# Patient Record
Sex: FEMALE | Race: WHITE | NOT HISPANIC OR LATINO | Employment: FULL TIME | URBAN - METROPOLITAN AREA
[De-identification: names, ages, dates, MRNs, and addresses within clinical notes are randomized per-mention and may not be internally consistent; named-entity substitution may affect disease eponyms.]

---

## 2017-01-23 ENCOUNTER — TELEPHONE (OUTPATIENT)
Dept: NEUROLOGY | Facility: MEDICAL CENTER | Age: 57
End: 2017-01-23

## 2017-01-23 NOTE — TELEPHONE ENCOUNTER
Called Levetiracetam ( Keppra)  500mg tabs  SIG take 1/2 tab by mouth twice daily and as needed. #35   11 [facility] to [facility]

## 2017-01-24 DIAGNOSIS — G40.109 SYMPTOMATIC LOCALIZATION-RELATED EPILEPSY (HCC): ICD-10-CM

## 2017-01-25 RX ORDER — LEVETIRACETAM 500 MG/1
250 TABLET ORAL 2 TIMES DAILY
Qty: 35 TAB | Refills: 11 | Status: SHIPPED | OUTPATIENT
Start: 2017-01-25 | End: 2017-01-27 | Stop reason: SDUPTHER

## 2017-01-25 NOTE — TELEPHONE ENCOUNTER
Was the patient seen in the last year in this department? Yes  3/8/16    Does patient have an active prescription for medications requested? Yes     Received Request Via: Pharmacy    Next Scheduled Appointment: 1/27/17

## 2017-01-27 ENCOUNTER — OFFICE VISIT (OUTPATIENT)
Dept: NEUROLOGY | Facility: MEDICAL CENTER | Age: 57
End: 2017-01-27
Payer: COMMERCIAL

## 2017-01-27 VITALS
BODY MASS INDEX: 19.14 KG/M2 | SYSTOLIC BLOOD PRESSURE: 100 MMHG | TEMPERATURE: 96.8 F | DIASTOLIC BLOOD PRESSURE: 56 MMHG | WEIGHT: 104 LBS | HEIGHT: 62 IN | OXYGEN SATURATION: 100 % | HEART RATE: 45 BPM

## 2017-01-27 DIAGNOSIS — G40.109 SYMPTOMATIC LOCALIZATION-RELATED EPILEPSY (HCC): Primary | ICD-10-CM

## 2017-01-27 PROCEDURE — 99213 OFFICE O/P EST LOW 20 MIN: CPT | Performed by: PSYCHIATRY & NEUROLOGY

## 2017-01-27 RX ORDER — LEVETIRACETAM 500 MG/1
TABLET ORAL
Qty: 50 TAB | Refills: 11 | Status: SHIPPED | OUTPATIENT
Start: 2017-01-27 | End: 2017-03-20 | Stop reason: SDUPTHER

## 2017-01-27 ASSESSMENT — ENCOUNTER SYMPTOMS
SEIZURES: 0
LOSS OF CONSCIOUSNESS: 0

## 2017-01-27 NOTE — PROGRESS NOTES
"Subjective:      Rosalia Haines is a 57 y.o. female who presents for one-year follow-up with a history of symptomatic partial seizures.    HPI    Seen nearly 1 year ago, Rosalia has had only the occasional premonitory symptoms of a \"sickness\" in her stomach, typically occurring in the morning hours. This never progresses. She remains on Keppra 500 mg in the morning, 250 mg in the afternoon, and an occasional 250 mg if she feels the need. The small recurrences tend to occur following a hard days work or a poor nights sleep. She remains physically active otherwise. She tolerates the Keppra without issue.    We did repeat MRI scan of the brain with and without contrast in April of last year after she was seen initially, this revealed only the right temporal encephalomalacia near the surgical site, no evidence of recurrent tumor or developing hydrocephalus.    Medical, surgical and family histories are reviewed, there are no new drug allergies. She is on Keppra as above, no other medications.    Review of Systems   Neurological: Negative for seizures and loss of consciousness.   All other systems reviewed and are negative.       Objective:     /56 mmHg  Pulse 45  Temp(Src) 36 °C (96.8 °F)  Ht 1.575 m (5' 2.01\")  Wt 47.174 kg (104 lb)  BMI 19.02 kg/m2  SpO2 100%     Physical Exam    She appears in no acute distress. Vital signs are stable. There is no malar rash. The neck is supple, cardiac evaluation is unremarkable. Including mental status, cranial nerves, motor, coordination, and sensory evaluations, her neurologic examination is fully intact without evidence of any focal deficits.    Assessment/Plan:     1. Symptomatic localization-related epilepsy (CMS-HCC)  Stable, I'm not overly concerned about the occasional simple partial seizures that she may be having in the morning hours. She is certainly well controlled otherwise. It still suggests he will likely need medication for the rest of her life, " certainly in the short-term. Fortunately the tumor has not recurred, the likelihood it well now that she is almost 18 years after resection, is quite unlikely. I will follow-up with her in one year. Phone contact in the interim if needed.    - levetiracetam (KEPPRA) 500 MG Tab; 1 tab qAM and 0.5 tab in afternoon, additional tab as needed  Dispense: 50 Tab; Refill: 11    Time: Evaluation of 20 minutes for exam, review, discussion, and education  Discussion: As mentioned in the assessment, over 50% of time spent face-to-face counseling and coordinating care.

## 2017-01-27 NOTE — MR AVS SNAPSHOT
"        Rosalia Haines   2017 1:20 PM   Office Visit   MRN: [id number]    Department:  [facility]   Dept Phone:  [phone]    Description:  Female : 1960   Provider:  Abimael Capone M.D.           Reason for Visit     Follow-Up seizures      Allergies as of 2017     No Known Allergies      You were diagnosed with     Symptomatic localization-related epilepsy (CMS-HCC)   [708286]         Vital Signs     Blood Pressure Pulse Temperature Height Weight Body Mass Index    100/56 mmHg 45 36 °C (96.8 °F) 1.575 m (5' 2.01\") 47.174 kg (104 lb) 19.02 kg/m2    Oxygen Saturation Smoking Status                100% Never Smoker           Basic Information     Date Of Birth Sex Race Ethnicity Preferred Language    1960 Female White Non- English      Problem List              ICD-10-CM Priority Class Noted - Resolved    Hyperlipidemia E78.5   10/21/2015 - Present    Faintness R55   10/21/2015 - Present    Breast mass N63   10/21/2015 - Present    Routine general medical examination at a health care facility Z00.00   10/21/2015 - Present    Psoriasis L40.9   10/21/2015 - Present    Elevated blood pressure I10   2016 - Present      Health Maintenance        Date Due Completion Dates    IMM DTaP/Tdap/Td Vaccine (1 - Tdap) 1981 ---    PAP SMEAR 1983 ---    MAMMOGRAM 2002 ---    COLONOSCOPY 2012 ---    IMM INFLUENZA (1) 2016 ---            Current Immunizations     No immunizations on file.      Below and/or attached are the medications your provider expects you to take. Review all of your home medications and newly ordered medications with your provider and/or pharmacist. Follow medication instructions as directed by your provider and/or pharmacist. Please keep your medication list with you and share with your provider. Update the information when medications are discontinued, doses are changed, or new medications (including over-the-counter products) are added; and " carry medication information at all times in the event of emergency situations     Allergies:  No Known Allergies          Medications  Valid as of: January 27, 2017 -  1:40 PM    Generic Name Brand Name Tablet Size Instructions for use    LevETIRAcetam (Tab) KEPPRA 500 MG 1 tab qAM and 0.5 tab in afternoon, additional tab as needed        .                 Medicines prescribed today were sent to:     [facility] - [place], [place] - [address] [facility] [address] [facility]    [address] [facility] [place] [address]    Phone: [phone] Fax: [phone]    Open 24 Hours?: No    [facility] - [place], [place] - [address] [facility]    [address] [facility] [place] [address]    Phone: [phone] Fax: [phone]    Open 24 Hours?: No      Medication refill instructions:       If your prescription bottle indicates you have medication refills left, it is not necessary to call your provider’s office. Please contact your pharmacy and they will refill your medication.    If your prescription bottle indicates you do not have any refills left, you may request refills at any time through one of the following ways: The online [employer] system (except Urgent Care), by calling your provider’s office, or by asking your pharmacy to contact your provider’s office with a refill request. Medication refills are processed only during regular business hours and may not be available until the next business day. Your provider may request additional information or to have a follow-up visit with you prior to refilling your medication.   *Please Note: Medication refills are assigned a new Rx number when refilled electronically. Your pharmacy may indicate that no refills were authorized even though a new prescription for the same medication is available at the pharmacy. Please request the medicine by name with the pharmacy before contacting your provider for a refill.           LaunchKey Access Code: Activation code not generated  Current LaunchKey Status:  Active

## 2017-02-24 ENCOUNTER — TELEPHONE (OUTPATIENT)
Dept: NEUROLOGY | Facility: MEDICAL CENTER | Age: 57
End: 2017-02-24

## 2017-02-24 NOTE — TELEPHONE ENCOUNTER
Ancelmo Vogt,     This Pt just called stating she had a recent sz and needs to see Dr. Estelita munoz.     I explained that his next avail appt is in May and she was pretty upset.   I let her know that she is on the wait list for a sooner opening and I also transferred her to your voicemail so that she could explain her concerns.     I just wanted to make sure you received the info.     Thank you,   Jade in scheduling       Called and LM on pt's cell and home number to return my call to discuss her seizure incident. YASMIN

## 2017-03-20 ENCOUNTER — OFFICE VISIT (OUTPATIENT)
Dept: NEUROLOGY | Facility: MEDICAL CENTER | Age: 57
End: 2017-03-20
Payer: COMMERCIAL

## 2017-03-20 VITALS
HEART RATE: 48 BPM | HEIGHT: 62 IN | OXYGEN SATURATION: 99 % | TEMPERATURE: 98.8 F | DIASTOLIC BLOOD PRESSURE: 68 MMHG | SYSTOLIC BLOOD PRESSURE: 122 MMHG | WEIGHT: 102 LBS | BODY MASS INDEX: 18.77 KG/M2 | RESPIRATION RATE: 16 BRPM

## 2017-03-20 DIAGNOSIS — G40.109 SYMPTOMATIC LOCALIZATION-RELATED EPILEPSY (HCC): Primary | ICD-10-CM

## 2017-03-20 PROCEDURE — 99213 OFFICE O/P EST LOW 20 MIN: CPT | Performed by: PSYCHIATRY & NEUROLOGY

## 2017-03-20 RX ORDER — LEVETIRACETAM 500 MG/1
500 TABLET ORAL 2 TIMES DAILY
Qty: 60 TAB | Refills: 11 | Status: SHIPPED | OUTPATIENT
Start: 2017-03-20 | End: 2018-01-26 | Stop reason: SDUPTHER

## 2017-03-20 NOTE — PROGRESS NOTES
Subjective:      Rosalia Haines is a 57 y.o. female who presents for follow-up with a history of seizures, with a recent recurrence in mid February of this year.     HPI    Rosalia had been doing very well, on Keppra 500 mg in the morning, 250 mg in the mid afternoon and occasional additional dose of 250 mg in the evenings. On the day in question, she had been at work, nothing unusual had occurred that morning, she had slept well the night before, had not been suffering from any flulike illnesses with vomiting or diarrhea, did not drink any alcohol, and was compliant with her a.m. dose of Keppra. There have been no change in the formulation of the drug recently. In any case, she had the sudden tiredness and then suffered from a generalized seizure. She awoke confused and tired, ambulance staff had been contacted, but she refused transfer to the emergency room. She recovered very quickly. Her  came and drove her home, a friend drove her car home. Since then, she has not been driving. She has been in her usual state of health. Her last secondarily generalized event occurred almost 3 years ago while she was on her elliptical machine. 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    Medical, surgical and family histories are reviewed, there are no new drug allergies. Since the seizure, she has now been taking the Keppra 250 mg additional dose on a regular basis, now 500 mg in the morning and then 250 mg at midday and evening.    Review of Systems   All other systems reviewed and are negative.       Objective:     There were no vitals taken for this visit.     Physical Exam    She appears in acute distress. Vital signs are stable. There is no malar rash or temporal tenderness. The neck is supple. Cardiac evaluation is unremarkable. Including mental status, cranial nerves, motor, reflex,  "coordination, and sensory evaluations, a complete neurologic examination is done and reveals no evidence of deficits for toxicities.     Assessment/Plan:     1. Symptomatic localization-related epilepsy (CMS-HCC)  I suspect this was a simple unprovoked recurrence. She was right to take the additional 250 mg dosing of Keppra on a regular basis, but he recommended now for ease and compliance, take 500 mg, twice a day. Given that she has her typical \"aura\" of a tiredness and \"sickly\" feeling prior to an actual loss of consciousness, she is safe to drive. She was reassured that these types of recurrences do occur on a spontaneous basis, with a rapid recovery, no sequelae, a normal exam today, I think it unlikely we are seeing the effect of tumor recurrence, swelling, or other significant intracranial structural pathology. This is not a metabolic issue in all likelihood, though this cannot be proven given this late date. She no circumstances that lower thresholds, she is always avoided these. At the same time, she was also reassured she can get back to working out, etc. without constraint. I will now follow up with her in one year as previously scheduled. She knows to call the office for any seizure recurrences, and at that point repeat imaging may be required.    Time: Evaluation of 20 minutes for exam, review, discussion, and education  Discussion: As mentioned in the assessment, over 70% of the time spent face-to-face counseling and coordinating care        "

## 2017-03-20 NOTE — MR AVS SNAPSHOT
"Rosalia Haines   3/20/2017 1:20 PM   Office Visit   MRN: [id number]    Department:  [facility]   Dept Phone:  [phone]    Description:  Female : 1960   Provider:  Abimael Capone M.D.           Reason for Visit     Follow-Up Seizures      Allergies as of 3/20/2017     No Known Allergies      You were diagnosed with     Symptomatic localization-related epilepsy (CMS-[facility])   [925915]  -  Primary       Vital Signs     Blood Pressure Pulse Temperature Respirations Height Weight    122/68 mmHg 48 37.1 °C (98.8 °F) 16 1.575 m (5' 2.01\") 46.267 kg (102 lb)    Body Mass Index Oxygen Saturation Smoking Status             18.65 kg/m2 99% Never Smoker          Basic Information     Date Of Birth Sex Race Ethnicity Preferred Language    1960 Female White Non- English      Your appointments     2018  2:00 PM   Follow Up Visit with Abimael Capone M.D.   [facility] (--)    [address] [place] [address], [address]  [facility] [id number] [phone]           You will be receiving a confirmation call a few days before your appointment from our automated call confirmation system.              Problem List              ICD-10-CM Priority Class Noted - Resolved    Hyperlipidemia E78.5   10/21/2015 - Present    Faintness R55   10/21/2015 - Present    Breast mass N63   10/21/2015 - Present    Routine general medical examination at a health care facility Z00.00   10/21/2015 - Present    Psoriasis L40.9   10/21/2015 - Present    Elevated blood pressure I10   2016 - Present    Symptomatic localization-related epilepsy (CMS-HCC) G40.109   2017 - Present      Health Maintenance        Date Due Completion Dates    IMM DTaP/Tdap/Td Vaccine (1 - Tdap) 1981 ---    PAP SMEAR 1983 ---    MAMMOGRAM 2002 ---    COLONOSCOPY 2012 ---    IMM INFLUENZA (1) 2016 ---            Current Immunizations     No immunizations on file.      Below and/or attached are the " medications your provider expects you to take. Review all of your home medications and newly ordered medications with your provider and/or pharmacist. Follow medication instructions as directed by your provider and/or pharmacist. Please keep your medication list with you and share with your provider. Update the information when medications are discontinued, doses are changed, or new medications (including over-the-counter products) are added; and carry medication information at all times in the event of emergency situations     Allergies:  No Known Allergies          Medications  Valid as of: March 20, 2017 -  1:41 PM    Generic Name Brand Name Tablet Size Instructions for use    LevETIRAcetam (Tab) KEPPRA 500 MG 1 tab qAM and 0.5 tab in afternoon, additional tab as needed        .                 Medicines prescribed today were sent to:     [facility] [place], [place] - [address] [facility]    [address] [facility] [address]    Phone: [phone] Fax: [phone]    Open 24 Hours?: No    [facility] - [place], [place] - [address] [facility]    [address] [facility] [place] [address]    Phone: [phone] Fax: [phone]    Open 24 Hours?: No      Medication refill instructions:       If your prescription bottle indicates you have medication refills left, it is not necessary to call your provider’s office. Please contact your pharmacy and they will refill your medication.    If your prescription bottle indicates you do not have any refills left, you may request refills at any time through one of the following ways: The online [email or web address] system (except Urgent Care), by calling your provider’s office, or by asking your pharmacy to contact your provider’s office with a refill request. Medication refills are processed only during regular business hours and may not be available until the next business day. Your provider may request additional information or to have a follow-up visit with you prior to refilling  your medication.   *Please Note: Medication refills are assigned a new Rx number when refilled electronically. Your pharmacy may indicate that no refills were authorized even though a new prescription for the same medication is available at the pharmacy. Please request the medicine by name with the pharmacy before contacting your provider for a refill.           MyChart Access Code: Activation code not generated  Current ADR Software[employer] Status: Active

## 2017-12-21 ENCOUNTER — OFFICE VISIT (OUTPATIENT)
Dept: URGENT CARE | Facility: CLINIC | Age: 57
End: 2017-12-21
Payer: COMMERCIAL

## 2017-12-21 VITALS
DIASTOLIC BLOOD PRESSURE: 80 MMHG | RESPIRATION RATE: 20 BRPM | TEMPERATURE: 97.2 F | OXYGEN SATURATION: 95 % | HEART RATE: 52 BPM | SYSTOLIC BLOOD PRESSURE: 120 MMHG

## 2017-12-21 DIAGNOSIS — H61.23 BILATERAL IMPACTED CERUMEN: ICD-10-CM

## 2017-12-21 PROCEDURE — 99213 OFFICE O/P EST LOW 20 MIN: CPT | Performed by: PHYSICIAN ASSISTANT

## 2017-12-21 ASSESSMENT — ENCOUNTER SYMPTOMS
SENSORY CHANGE: 0
SORE THROAT: 0
CHILLS: 0
MYALGIAS: 0
TINGLING: 0
FEVER: 0
NAUSEA: 0
COUGH: 0
ABDOMINAL PAIN: 0
ARTHRALGIAS: 0
SHORTNESS OF BREATH: 0
VERTIGO: 0
FOCAL WEAKNESS: 0
VISUAL CHANGE: 0
VOMITING: 0
SWOLLEN GLANDS: 0
HEADACHES: 0

## 2017-12-21 NOTE — PROGRESS NOTES
Subjective:      Rosalia Haines is a 57 y.o. female who presents with Ear Fullness            Ear Fullness   This is a chronic problem. Episode onset: 2-3 weeks. The problem occurs constantly. The problem has been unchanged. Pertinent negatives include no abdominal pain, arthralgias, chills, congestion, coughing, fever, headaches, myalgias, nausea, rash, sore throat, swollen glands, vertigo, visual change or vomiting. Associated symptoms comments: Decreased hearing. No hear pain. Nothing aggravates the symptoms. She has tried nothing for the symptoms.       Past Medical History:   Diagnosis Date   • Cerumen impaction    • Elevated blood pressure 1/14/2016   • Psoriasis      Past Surgical History:   Procedure Laterality Date   • SETTLEMENT (INSURANCE)  10/25/2011    Performed by SHAY DEAN at [facility]   • TUBAL LIGATION         Family History   Problem Relation Age of Onset   • Hypertension Mother    • Heart Attack Father        No Known Allergies    Medications, Allergies, and current problem list reviewed today in Epic    Review of Systems   Constitutional: Negative for chills, fever and malaise/fatigue.   HENT: Negative for congestion, ear discharge, ear pain and sore throat.         Ear fullness bilateral   Respiratory: Negative for cough and shortness of breath.    Gastrointestinal: Negative for abdominal pain, nausea and vomiting.   Musculoskeletal: Negative for arthralgias and myalgias.   Skin: Negative for rash.   Neurological: Negative for vertigo, tingling, sensory change, focal weakness and headaches.     All other systems reviewed and are negative.        Objective:     /80   Pulse (!) 52   Temp 36.2 °C (97.2 °F)   Resp 20   SpO2 95%      Physical Exam   Constitutional: She is oriented to person, place, and time. She appears well-developed and well-nourished. No distress.   HENT:   Head: Normocephalic and atraumatic.   Right Ear: External ear normal.   Left Ear: External  ear normal.   Ears before lavage- bilateral cerumen impaction.    Ears after lavage: ear canals clear without erythema or edema. Psoriatic changes noted to ear canals bilaterally. TMs intact with bony landmarks visualized.   Eyes: Conjunctivae are normal.   Pulmonary/Chest: Effort normal. No respiratory distress.   Neurological: She is alert and oriented to person, place, and time. No cranial nerve deficit.   Psychiatric: She has a normal mood and affect. Her behavior is normal. Judgment and thought content normal.               Assessment/Plan:     1. Bilateral impacted cerumen    Cerumen lavage.  Encouraged OTC Debrox drops.     Differential diagnoses, Supportive care, and indications for immediate follow-up discussed with patient.   Instructed to return to clinic or nearest emergency department for any change in condition, further concerns, or worsening of symptoms.    The patient demonstrated a good understanding and agreed with the treatment plan.    Denae Gonzales P.A.-C.  ]

## 2018-01-26 ENCOUNTER — OFFICE VISIT (OUTPATIENT)
Dept: NEUROLOGY | Facility: MEDICAL CENTER | Age: 58
End: 2018-01-26
Payer: COMMERCIAL

## 2018-01-26 VITALS
WEIGHT: 105 LBS | TEMPERATURE: 96.5 F | RESPIRATION RATE: 14 BRPM | SYSTOLIC BLOOD PRESSURE: 130 MMHG | OXYGEN SATURATION: 100 % | HEART RATE: 47 BPM | BODY MASS INDEX: 18.61 KG/M2 | DIASTOLIC BLOOD PRESSURE: 70 MMHG | HEIGHT: 63 IN

## 2018-01-26 DIAGNOSIS — G40.109 SYMPTOMATIC LOCALIZATION-RELATED EPILEPSY (HCC): Primary | ICD-10-CM

## 2018-01-26 PROCEDURE — 99213 OFFICE O/P EST LOW 20 MIN: CPT | Performed by: PSYCHIATRY & NEUROLOGY

## 2018-01-26 RX ORDER — LEVETIRACETAM 500 MG/1
500 TABLET ORAL 2 TIMES DAILY
Qty: 60 TAB | Refills: 11 | Status: SHIPPED | OUTPATIENT
Start: 2018-01-26 | End: 2019-01-25 | Stop reason: SDUPTHER

## 2018-01-26 ASSESSMENT — PATIENT HEALTH QUESTIONNAIRE - PHQ9: CLINICAL INTERPRETATION OF PHQ2 SCORE: 0

## 2018-01-26 ASSESSMENT — PAIN SCALES - GENERAL: PAINLEVEL: NO PAIN

## 2018-01-26 NOTE — PROGRESS NOTES
"Subjective:      Rosalia Haines is a 58 y.o. female who presents for annual follow-up with a history of symptomatic partial seizures with rare secondary generalization.    HPI    Rosalia has continued to do well over the last year. She has had no secondarily generalized events, though she has noted a pattern to the partial events that she has. They're still the sudden feeling of sickness and malaise, but never progresses further. These now tend to happen towards the end of the day after she has gotten home after a rough day's work, so she is already more fatigued than usual. She gets on with her usual physical exercise including running inside on a treadmill or outside. It is then that she may have one of these events. Normally she can engage in this type of activity without issue. She is driving safely. These events do not typically happen in the morning. They are brief, resolved without sequelae.    She remains compliant with her Keppra 500 mg in the morning and 250 mg in the evening. With the above events, she will always take her additional 250 mg tablet dose.    Medical, surgical and family history reviewed, there are no new drug allergies. She is on her Keppra 500 mg in morning and 250 mg in the evening, the occasional 250 mg dose taken following one of her smaller seizures.    Review of Systems   All other systems reviewed and are negative.       Objective:     /70   Pulse (!) 47   Temp 35.8 °C (96.5 °F)   Resp 14   Ht 1.6 m (5' 3\")   Wt 47.6 kg (105 lb)   SpO2 100%   BMI 18.60 kg/m²      Physical Exam    She appears in no acute distress. She is quite cooperative. She is a very pleasant spirit and demeanor. Vital signs are stable. Her pulse is a little low at 47, but she is asymptomatic. There is no malar rash or temporal tenderness. The neck is supple, range of motion is full. Including mental status, cranial nerves, motor, reflexes, coordination, and sensory evaluations, the complete " examination is done and reveals no evidence of deficits.     Assessment/Plan:     1. Symptomatic localization-related epilepsy (CMS-HCC)  Stable, we will continue the Keppra regimen, though the prescription is written as 500 mg, twice a day, she simply takes it as a 500 mg dose in the morning and 250 mg in the evening. One year follow-up is scheduled. She'll keep track of these events towards the afternoons, it is not unheard of to have seizures occur in patients who were more than unusually fatigued. I don't believe she needs to change her personal lifestyle in this type of simple circumstance, she simply knows to avoid exercising if she feels herself to be more fatigued than usual after she gets home from work.    - levetiracetam (KEPPRA) 500 MG Tab; Take 1 Tab by mouth 2 times a day.  Dispense: 60 Tab; Refill: 11    Time: Evaluation of 20 minutes for exam come review, discussion, and education  Discussion: As mentioned in the assessment, over 70% of the time spent face-to-face counseling and coordinating care

## 2018-02-16 ENCOUNTER — HOSPITAL ENCOUNTER (OUTPATIENT)
Dept: LAB | Facility: MEDICAL CENTER | Age: 58
End: 2018-02-16
Attending: OBSTETRICS & GYNECOLOGY
Payer: COMMERCIAL

## 2018-02-16 LAB
25(OH)D3 SERPL-MCNC: 28 NG/ML (ref 30–100)
ALBUMIN SERPL BCP-MCNC: 4.4 G/DL (ref 3.2–4.9)
ALBUMIN/GLOB SERPL: 1.7 G/DL
ALP SERPL-CCNC: 74 U/L (ref 30–99)
ALT SERPL-CCNC: 14 U/L (ref 2–50)
ANION GAP SERPL CALC-SCNC: 6 MMOL/L (ref 0–11.9)
AST SERPL-CCNC: 24 U/L (ref 12–45)
BILIRUB SERPL-MCNC: 0.6 MG/DL (ref 0.1–1.5)
BUN SERPL-MCNC: 26 MG/DL (ref 8–22)
CALCIUM SERPL-MCNC: 9.1 MG/DL (ref 8.5–10.5)
CHLORIDE SERPL-SCNC: 105 MMOL/L (ref 96–112)
CHOLEST SERPL-MCNC: 207 MG/DL (ref 100–199)
CO2 SERPL-SCNC: 27 MMOL/L (ref 20–33)
CREAT SERPL-MCNC: 0.77 MG/DL (ref 0.5–1.4)
ERYTHROCYTE [DISTWIDTH] IN BLOOD BY AUTOMATED COUNT: 44.4 FL (ref 35.9–50)
GLOBULIN SER CALC-MCNC: 2.6 G/DL (ref 1.9–3.5)
GLUCOSE SERPL-MCNC: 93 MG/DL (ref 65–99)
HCT VFR BLD AUTO: 43.8 % (ref 37–47)
HDLC SERPL-MCNC: 104 MG/DL
HGB BLD-MCNC: 13.9 G/DL (ref 12–16)
LDLC SERPL CALC-MCNC: 95 MG/DL
MCH RBC QN AUTO: 28.3 PG (ref 27–33)
MCHC RBC AUTO-ENTMCNC: 31.7 G/DL (ref 33.6–35)
MCV RBC AUTO: 89 FL (ref 81.4–97.8)
PLATELET # BLD AUTO: 176 K/UL (ref 164–446)
PMV BLD AUTO: 11.5 FL (ref 9–12.9)
POTASSIUM SERPL-SCNC: 3.5 MMOL/L (ref 3.6–5.5)
PROT SERPL-MCNC: 7 G/DL (ref 6–8.2)
RBC # BLD AUTO: 4.92 M/UL (ref 4.2–5.4)
SODIUM SERPL-SCNC: 138 MMOL/L (ref 135–145)
T4 FREE SERPL-MCNC: 0.85 NG/DL (ref 0.53–1.43)
TRIGL SERPL-MCNC: 40 MG/DL (ref 0–149)
TSH SERPL DL<=0.005 MIU/L-ACNC: 0.59 UIU/ML (ref 0.38–5.33)
WBC # BLD AUTO: 3.3 K/UL (ref 4.8–10.8)

## 2018-02-16 PROCEDURE — 82306 VITAMIN D 25 HYDROXY: CPT

## 2018-02-16 PROCEDURE — 85027 COMPLETE CBC AUTOMATED: CPT

## 2018-02-16 PROCEDURE — 36415 COLL VENOUS BLD VENIPUNCTURE: CPT

## 2018-02-16 PROCEDURE — 80053 COMPREHEN METABOLIC PANEL: CPT

## 2018-02-16 PROCEDURE — 80061 LIPID PANEL: CPT

## 2018-02-16 PROCEDURE — 84439 ASSAY OF FREE THYROXINE: CPT

## 2018-02-16 PROCEDURE — 84443 ASSAY THYROID STIM HORMONE: CPT

## 2018-06-18 ENCOUNTER — OFFICE VISIT (OUTPATIENT)
Dept: NEUROLOGY | Facility: MEDICAL CENTER | Age: 58
End: 2018-06-18
Payer: COMMERCIAL

## 2018-06-18 VITALS
DIASTOLIC BLOOD PRESSURE: 68 MMHG | WEIGHT: 104 LBS | HEART RATE: 62 BPM | BODY MASS INDEX: 18.43 KG/M2 | SYSTOLIC BLOOD PRESSURE: 124 MMHG | HEIGHT: 63 IN

## 2018-06-18 DIAGNOSIS — G40.109 SYMPTOMATIC LOCALIZATION-RELATED EPILEPSY (HCC): ICD-10-CM

## 2018-06-18 PROCEDURE — 99213 OFFICE O/P EST LOW 20 MIN: CPT | Performed by: PSYCHIATRY & NEUROLOGY

## 2018-06-18 ASSESSMENT — ENCOUNTER SYMPTOMS
DIARRHEA: 0
SEIZURES: 1
MEMORY LOSS: 0
LOSS OF CONSCIOUSNESS: 0

## 2018-06-18 NOTE — PROGRESS NOTES
"Subjective:      Rosalia Haines is a 58 y.o. female who presents on a more urgent basis, with a history of symptomatic partial seizures with altered sensorium, who had 2 recurrent events in April, of unclear reason, but who now requires a work release form completion.    HPI    Rosalia had been stable, last seen in January, and she had 2 subsequent events, this time while at work. Compliant with Keppra 500 mg in the morning, 250 mg in the evening, and an occasional 250 mg dose in anticipation of certain circumstance, she denied any other extenuating circumstances. Events were brief, she remembers being awake and alert, unable to respond, almost on a different planet, unusual in that normally her simple partial seizures begin with GI disturbances. Staff witnessed that she simply sort of space out briefly. As these were witnessed by staff at her job, now she needs a work release form completed. She's been in her usual state of health otherwise. Prior to these 2 small events, she has been seizure-free for quite some time.    Medical, surgical and family histories are reviewed, there are no new drug allergies. She is on her Keppra dosing as above.    Review of Systems   Gastrointestinal: Negative for diarrhea.   Neurological: Positive for seizures. Negative for loss of consciousness.   Psychiatric/Behavioral: Negative for memory loss.   All other systems reviewed and are negative.       Objective:     /68   Pulse 62   Ht 1.6 m (5' 3\")   Wt 47.2 kg (104 lb)   BMI 18.42 kg/m²      Physical Exam    She appears in no acute distress. Vital signs are stable. There is no malar rash. The neck is supple and range of motion is full. Cardiac evaluation reveals a regular rhythm. Including mental status, cranial nerve, motor, reflexes, coordination, and sensory evaluations, her full examination remains intact, as always, showing no sign of deficit neurotoxicity.     Assessment/Plan:     1. Symptomatic " localization-related epilepsy (HCC)  She will keep an eye on these seizures, both of us concerned simply as to whether or not a pattern of recurrence has started. It doesn't seem that is the case, I can certainly release her back to work without restriction as of today's date. She was told to keep track of these events, especially as it has to do with work since there may be a need to repeat an complete these forms. Again, we can always consider changing her medication regimen, she certainly can't tolerate a higher dose of Keppra. For now we will simply follow up as previously scheduled in about 8 months.    Time: Evaluation of 20 minutes for exam, review, discussion, and education  Discussion: As mentioned in assessment, over 50% of the time spent in face-to-face counseling and coordinating care

## 2019-01-25 ENCOUNTER — OFFICE VISIT (OUTPATIENT)
Dept: NEUROLOGY | Facility: MEDICAL CENTER | Age: 59
End: 2019-01-25
Payer: COMMERCIAL

## 2019-01-25 VITALS
SYSTOLIC BLOOD PRESSURE: 116 MMHG | HEIGHT: 63 IN | TEMPERATURE: 96.6 F | DIASTOLIC BLOOD PRESSURE: 72 MMHG | BODY MASS INDEX: 18.57 KG/M2 | RESPIRATION RATE: 18 BRPM | WEIGHT: 104.8 LBS | HEART RATE: 55 BPM | OXYGEN SATURATION: 99 %

## 2019-01-25 DIAGNOSIS — G40.109 SYMPTOMATIC LOCALIZATION-RELATED EPILEPSY (HCC): Primary | ICD-10-CM

## 2019-01-25 PROCEDURE — 99214 OFFICE O/P EST MOD 30 MIN: CPT | Performed by: PSYCHIATRY & NEUROLOGY

## 2019-01-25 RX ORDER — LEVETIRACETAM 500 MG/1
500 TABLET ORAL 2 TIMES DAILY
Qty: 60 TAB | Refills: 11 | Status: SHIPPED | OUTPATIENT
Start: 2019-01-25 | End: 2020-01-31 | Stop reason: SDUPTHER

## 2019-01-25 ASSESSMENT — ENCOUNTER SYMPTOMS
MEMORY LOSS: 0
TREMORS: 0
WEIGHT LOSS: 0
FALLS: 0
CONSTIPATION: 0
LOSS OF CONSCIOUSNESS: 0
NAUSEA: 0
SEIZURES: 1

## 2019-01-25 ASSESSMENT — PATIENT HEALTH QUESTIONNAIRE - PHQ9: CLINICAL INTERPRETATION OF PHQ2 SCORE: 0

## 2019-01-25 NOTE — PROGRESS NOTES
"Subjective:      Rosalia Haines is a 59 y.o. female who presents for 6-month follow-up, with a history of symptomatic focal onset seizures with and without altered sensorium, now having an increasing frequency.    HPI    Since last seen, she has noted a pattern of having focal events without altered sensorium, and in the last 6 months, on a more regular basis.  She seems to be having them once every month, they tend to occur after she has gotten off the treadmill, she still works out regularly, fairly hard with a good sweat broken.  She has some sense that she needs to get off the treadmill when this happens, she gets off the treadmill, her  has witnessed her to be a little altered, speech is incomprehensible, and she recovers fairly quickly though she is a little tired.  She has never lost consciousness.  These have never become a generalized issue.    When this occurs, she denies any consistent change in her regimen, she remains compliant with her Keppra, these events occur several hours after her midday dose of Keppra.    Medical, surgical and family histories are reviewed, there are no new drug allergies.  There are no new medical diagnoses documented.  She is on Keppra 500 mg every morning and 250 mg in the afternoon, she will use an occasional OTC pain reliever.    Review of Systems   Constitutional: Negative for malaise/fatigue and weight loss.   Gastrointestinal: Negative for constipation and nausea.   Musculoskeletal: Negative for falls.   Neurological: Positive for seizures. Negative for tremors and loss of consciousness.   Psychiatric/Behavioral: Negative for memory loss.   All other systems reviewed and are negative.       Objective:     /72   Pulse (!) 55   Temp 35.9 °C (96.6 °F)   Resp 18   Ht 1.6 m (5' 3\")   Wt 47.5 kg (104 lb 12.8 oz)   SpO2 99%   BMI 18.56 kg/m²      Physical Exam    She appears in no acute distress.  Clean and appropriately dressed, she is quite " cooperative.  Her vital signs are stable.  There is no malar rash.  The neck is supple.  Cardiac evaluation reveals a regular rhythm.  Including mental status, cranial nerve, musculoskeletal, coordination, and since evaluations, examination, as always, remains fully intact.  There is no sign of deficit neurotoxicity.     Assessment/Plan:     1. Symptomatic localization-related epilepsy (HCC)  Though she had had a couple of rare recurrences earlier last year, since the mid year, I am a little concerned about this increased frequency.  I am not quite sure what to make of this association in the afternoons after she has been working out, a pattern that she has maintained on a daily basis for quite a long time.    We discussed several options, the easiest of which is to do nothing.  The smartest would be to have her observe these events, looking for patterns that might increase risk leading into event recurrence, time of day, etc.  Another EEG should be done to see if there is a change, with increased susceptibility or electroencephalographic seizure activity now being shown.  If this is the case, we will need to increase the dosing of her Keppra.  If the events do begin to show a consistent pattern, as well as increasing frequency, increasing the drug empirically would be another option, even if the EEG itself is unremarkable, and we can simply observe as to what happens on the higher dose.  All of this was reviewed in full, we will call her with the EEG if it is abnormal, we will otherwise follow-up in about 4 months to review her observations as well and for further decisions to be made.    - REFERRAL TO NEURODIAGNOSTICS (EEG,EP,EMG/NCS/DBS) Modality Requested: EEG-Video  - levETIRAcetam (KEPPRA) 500 MG Tab; Take 1 Tab by mouth 2 times a day.  Dispense: 60 Tab; Refill: 11    Time: 25 minutes spent face-to-face for exam, review, discussion, and education, of this over 60% of the time spent counseling and coordinating  care surrounding all of the above issues.

## 2019-02-22 ENCOUNTER — HOSPITAL ENCOUNTER (OUTPATIENT)
Dept: LAB | Facility: MEDICAL CENTER | Age: 59
End: 2019-02-22
Attending: OBSTETRICS & GYNECOLOGY
Payer: COMMERCIAL

## 2019-02-22 PROCEDURE — 84443 ASSAY THYROID STIM HORMONE: CPT

## 2019-02-22 PROCEDURE — 80053 COMPREHEN METABOLIC PANEL: CPT

## 2019-02-22 PROCEDURE — 85027 COMPLETE CBC AUTOMATED: CPT

## 2019-02-22 PROCEDURE — 36415 COLL VENOUS BLD VENIPUNCTURE: CPT

## 2019-02-22 PROCEDURE — 84439 ASSAY OF FREE THYROXINE: CPT

## 2019-02-22 PROCEDURE — 80061 LIPID PANEL: CPT

## 2019-02-22 PROCEDURE — 82306 VITAMIN D 25 HYDROXY: CPT

## 2019-02-23 LAB
25(OH)D3 SERPL-MCNC: 32 NG/ML (ref 30–100)
ALBUMIN SERPL BCP-MCNC: 4.6 G/DL (ref 3.2–4.9)
ALBUMIN/GLOB SERPL: 1.6 G/DL
ALP SERPL-CCNC: 66 U/L (ref 30–99)
ALT SERPL-CCNC: 16 U/L (ref 2–50)
ANION GAP SERPL CALC-SCNC: 7 MMOL/L (ref 0–11.9)
AST SERPL-CCNC: 31 U/L (ref 12–45)
BILIRUB SERPL-MCNC: 0.4 MG/DL (ref 0.1–1.5)
BUN SERPL-MCNC: 19 MG/DL (ref 8–22)
CALCIUM SERPL-MCNC: 9.8 MG/DL (ref 8.5–10.5)
CHLORIDE SERPL-SCNC: 106 MMOL/L (ref 96–112)
CHOLEST SERPL-MCNC: 226 MG/DL (ref 100–199)
CO2 SERPL-SCNC: 27 MMOL/L (ref 20–33)
CREAT SERPL-MCNC: 0.76 MG/DL (ref 0.5–1.4)
ERYTHROCYTE [DISTWIDTH] IN BLOOD BY AUTOMATED COUNT: 43.8 FL (ref 35.9–50)
GLOBULIN SER CALC-MCNC: 2.8 G/DL (ref 1.9–3.5)
GLUCOSE SERPL-MCNC: 84 MG/DL (ref 65–99)
HCT VFR BLD AUTO: 46 % (ref 37–47)
HDLC SERPL-MCNC: 94 MG/DL
HGB BLD-MCNC: 15.1 G/DL (ref 12–16)
LDLC SERPL CALC-MCNC: 120 MG/DL
MCH RBC QN AUTO: 29.6 PG (ref 27–33)
MCHC RBC AUTO-ENTMCNC: 32.8 G/DL (ref 33.6–35)
MCV RBC AUTO: 90.2 FL (ref 81.4–97.8)
PLATELET # BLD AUTO: 185 K/UL (ref 164–446)
PMV BLD AUTO: 11.6 FL (ref 9–12.9)
POTASSIUM SERPL-SCNC: 4 MMOL/L (ref 3.6–5.5)
PROT SERPL-MCNC: 7.4 G/DL (ref 6–8.2)
RBC # BLD AUTO: 5.1 M/UL (ref 4.2–5.4)
SODIUM SERPL-SCNC: 140 MMOL/L (ref 135–145)
T4 FREE SERPL-MCNC: 0.8 NG/DL (ref 0.53–1.43)
TRIGL SERPL-MCNC: 59 MG/DL (ref 0–149)
TSH SERPL DL<=0.005 MIU/L-ACNC: 0.78 UIU/ML (ref 0.38–5.33)
WBC # BLD AUTO: 3.5 K/UL (ref 4.8–10.8)

## 2020-01-24 ENCOUNTER — HOSPITAL ENCOUNTER (OUTPATIENT)
Dept: LAB | Facility: MEDICAL CENTER | Age: 60
End: 2020-01-24
Attending: FAMILY MEDICINE
Payer: COMMERCIAL

## 2020-01-24 LAB
ALBUMIN SERPL BCP-MCNC: 4 G/DL (ref 3.2–4.9)
ALBUMIN/GLOB SERPL: 1.3 G/DL
ALP SERPL-CCNC: 67 U/L (ref 30–99)
ALT SERPL-CCNC: 16 U/L (ref 2–50)
ANION GAP SERPL CALC-SCNC: 9 MMOL/L (ref 0–11.9)
AST SERPL-CCNC: 28 U/L (ref 12–45)
BASOPHILS # BLD AUTO: 1.1 % (ref 0–1.8)
BASOPHILS # BLD: 0.03 K/UL (ref 0–0.12)
BILIRUB SERPL-MCNC: 0.4 MG/DL (ref 0.1–1.5)
BUN SERPL-MCNC: 26 MG/DL (ref 8–22)
CALCIUM SERPL-MCNC: 9.9 MG/DL (ref 8.5–10.5)
CHLORIDE SERPL-SCNC: 107 MMOL/L (ref 96–112)
CHOLEST SERPL-MCNC: 195 MG/DL (ref 100–199)
CO2 SERPL-SCNC: 25 MMOL/L (ref 20–33)
CREAT SERPL-MCNC: 0.84 MG/DL (ref 0.5–1.4)
EOSINOPHIL # BLD AUTO: 0.05 K/UL (ref 0–0.51)
EOSINOPHIL NFR BLD: 1.9 % (ref 0–6.9)
ERYTHROCYTE [DISTWIDTH] IN BLOOD BY AUTOMATED COUNT: 42.5 FL (ref 35.9–50)
FASTING STATUS PATIENT QL REPORTED: NORMAL
GLOBULIN SER CALC-MCNC: 3 G/DL (ref 1.9–3.5)
GLUCOSE SERPL-MCNC: 84 MG/DL (ref 65–99)
HCT VFR BLD AUTO: 43.7 % (ref 37–47)
HCV AB SER QL: NEGATIVE
HDLC SERPL-MCNC: 87 MG/DL
HGB BLD-MCNC: 14.2 G/DL (ref 12–16)
IMM GRANULOCYTES # BLD AUTO: 0 K/UL (ref 0–0.11)
IMM GRANULOCYTES NFR BLD AUTO: 0 % (ref 0–0.9)
LDLC SERPL CALC-MCNC: 100 MG/DL
LYMPHOCYTES # BLD AUTO: 1.21 K/UL (ref 1–4.8)
LYMPHOCYTES NFR BLD: 44.8 % (ref 22–41)
MCH RBC QN AUTO: 29.7 PG (ref 27–33)
MCHC RBC AUTO-ENTMCNC: 32.5 G/DL (ref 33.6–35)
MCV RBC AUTO: 91.4 FL (ref 81.4–97.8)
MONOCYTES # BLD AUTO: 0.19 K/UL (ref 0–0.85)
MONOCYTES NFR BLD AUTO: 7 % (ref 0–13.4)
NEUTROPHILS # BLD AUTO: 1.22 K/UL (ref 2–7.15)
NEUTROPHILS NFR BLD: 45.2 % (ref 44–72)
NRBC # BLD AUTO: 0 K/UL
NRBC BLD-RTO: 0 /100 WBC
PLATELET # BLD AUTO: 161 K/UL (ref 164–446)
PMV BLD AUTO: 11.2 FL (ref 9–12.9)
POTASSIUM SERPL-SCNC: 4.2 MMOL/L (ref 3.6–5.5)
PROT SERPL-MCNC: 7 G/DL (ref 6–8.2)
RBC # BLD AUTO: 4.78 M/UL (ref 4.2–5.4)
SODIUM SERPL-SCNC: 141 MMOL/L (ref 135–145)
TRIGL SERPL-MCNC: 39 MG/DL (ref 0–149)
WBC # BLD AUTO: 2.7 K/UL (ref 4.8–10.8)

## 2020-01-24 PROCEDURE — 85025 COMPLETE CBC W/AUTO DIFF WBC: CPT

## 2020-01-24 PROCEDURE — 80061 LIPID PANEL: CPT

## 2020-01-24 PROCEDURE — 80053 COMPREHEN METABOLIC PANEL: CPT

## 2020-01-24 PROCEDURE — 36415 COLL VENOUS BLD VENIPUNCTURE: CPT

## 2020-01-24 PROCEDURE — 86803 HEPATITIS C AB TEST: CPT

## 2020-01-31 ENCOUNTER — OFFICE VISIT (OUTPATIENT)
Dept: NEUROLOGY | Facility: MEDICAL CENTER | Age: 60
End: 2020-01-31
Payer: COMMERCIAL

## 2020-01-31 VITALS
BODY MASS INDEX: 18.43 KG/M2 | HEART RATE: 70 BPM | OXYGEN SATURATION: 97 % | HEIGHT: 63 IN | WEIGHT: 104 LBS | SYSTOLIC BLOOD PRESSURE: 102 MMHG | DIASTOLIC BLOOD PRESSURE: 70 MMHG

## 2020-01-31 DIAGNOSIS — Z98.890 STATUS POST RESECTION OF MENINGIOMA: ICD-10-CM

## 2020-01-31 DIAGNOSIS — Z86.018 STATUS POST RESECTION OF MENINGIOMA: ICD-10-CM

## 2020-01-31 DIAGNOSIS — G40.109 SYMPTOMATIC LOCALIZATION-RELATED EPILEPSY (HCC): Primary | ICD-10-CM

## 2020-01-31 PROCEDURE — 99213 OFFICE O/P EST LOW 20 MIN: CPT | Performed by: PSYCHIATRY & NEUROLOGY

## 2020-01-31 RX ORDER — LEVETIRACETAM 500 MG/1
TABLET ORAL
Qty: 50 TAB | Refills: 11 | Status: SHIPPED | OUTPATIENT
Start: 2020-01-31 | End: 2021-01-11 | Stop reason: SDUPTHER

## 2020-01-31 ASSESSMENT — ENCOUNTER SYMPTOMS
LOSS OF CONSCIOUSNESS: 0
MEMORY LOSS: 0
SEIZURES: 1

## 2020-02-01 NOTE — PROGRESS NOTES
"Subjective:      Rosalia Haines is a 60 y.o. female who presents for annual follow-up, with a history of symptomatic seizures related to a right temporal meningioma resection in 1999.     HPI    When last seen, she had a couple of unusual seizure recurrences associated with her physical activity.  An active exerciser, she goes to the gym 6 times a week, neither 1 of us were happy in this regard.  Because of concerns, I had recommended increasing Keppra by 250 mg increment, she was originally on 500 mg every morning and 250 mg every evening, thus 500 mg, twice daily.  Always uncomfortable with playing with her medicines, she never did so, fortunately she has had no significant change in her seizure frequency over the last year.  Her last seizure occurred while at work, not with exercise, about 2 months ago.  She has been compliant with her medication.    For the same reasons, I had wanted an EEG study to be repeated, but again because of the expense (her deductible is quite high), she held off especially in light of a reduction in seizure recurrence.    Medical, surgical and family histories are reviewed, there are no new drug allergies.  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  She is on Keppra 500 mg every morning and 250 mg every evening.    Review of Systems   Neurological: Positive for seizures. Negative for loss of consciousness.   Psychiatric/Behavioral: Negative for memory loss.   All other systems reviewed and are negative.       Objective:     /70 (BP Location: Left arm, Patient Position: Sitting, BP Cuff Size: Adult)   Pulse 70   Ht 1.6 m (5' 3\")   Wt 47.2 kg (104 lb)   SpO2 97%   BMI 18.42 kg/m²      Physical Exam    She appears in no acute distress.  Vital signs are stable.  There is no malar rash.  The neck is supple.  Cardiac evaluation is unremarkable.    Including mental " status, cranial nerve, musculoskeletal, coordination commonsense evaluations, full neurologic examination again is done and reveals no evidence of deficits nor toxicities.     Assessment/Plan:     1. Symptomatic localization-related epilepsy (HCC)  For now, we will continue her Keppra regimen unchanged.  She know circumstances under which we may need to reconsider increasing the Keppra, hopefully we will not need to go there.  Higher doses of the drug were never easily tolerated though we can push it a little bit.  Though I would like to get another EEG, since these results will be more helpful in regards to changing therapy, she wants to hold.  I did complete the paperwork her employer had required describing her condition, prognosis, and associated disabilities (none), etc.    - levETIRAcetam (KEPPRA) 500 MG Tab; 1 tab qAM and 0.5 tab qPM  Dispense: 50 Tab; Refill: 11    2. Status post resection of meningioma  She is a little concerned about a recurrence of her tumor, though her last MRI imaging study from 2016 showed no evidence of tumor recurrence, only encephalomalacia and postoperative changes.  Still, an MRI will be done for thoroughness.  We will call her with results.    - MR-BRAIN-WITH & W/O; Future  - Renal Function Panel; Future    Time: 20 minutes spent face-to-face for exam, review, discussion, and education, of this over 50% of the time spent counseling and coordinating care.

## 2020-06-01 ENCOUNTER — APPOINTMENT (OUTPATIENT)
Dept: NEUROLOGY | Facility: MEDICAL CENTER | Age: 60
End: 2020-06-01
Payer: COMMERCIAL

## 2021-01-11 ENCOUNTER — OFFICE VISIT (OUTPATIENT)
Dept: NEUROLOGY | Facility: MEDICAL CENTER | Age: 61
End: 2021-01-11
Attending: PSYCHIATRY & NEUROLOGY
Payer: COMMERCIAL

## 2021-01-11 VITALS
BODY MASS INDEX: 18.43 KG/M2 | DIASTOLIC BLOOD PRESSURE: 70 MMHG | SYSTOLIC BLOOD PRESSURE: 128 MMHG | WEIGHT: 104 LBS | TEMPERATURE: 98.1 F | OXYGEN SATURATION: 100 % | HEART RATE: 50 BPM | HEIGHT: 63 IN

## 2021-01-11 DIAGNOSIS — G40.109 SYMPTOMATIC LOCALIZATION-RELATED EPILEPSY (HCC): Primary | ICD-10-CM

## 2021-01-11 DIAGNOSIS — Z86.018 STATUS POST RESECTION OF MENINGIOMA: ICD-10-CM

## 2021-01-11 DIAGNOSIS — Z98.890 STATUS POST RESECTION OF MENINGIOMA: ICD-10-CM

## 2021-01-11 PROCEDURE — 99213 OFFICE O/P EST LOW 20 MIN: CPT | Performed by: PSYCHIATRY & NEUROLOGY

## 2021-01-11 PROCEDURE — 99211 OFF/OP EST MAY X REQ PHY/QHP: CPT | Performed by: PSYCHIATRY & NEUROLOGY

## 2021-01-11 RX ORDER — VITAMIN B COMPLEX
1000 TABLET ORAL DAILY
COMMUNITY

## 2021-01-11 RX ORDER — LEVETIRACETAM 500 MG/1
TABLET ORAL
Qty: 50 TAB | Refills: 11 | Status: SHIPPED | OUTPATIENT
Start: 2021-01-11 | End: 2022-01-31 | Stop reason: SDUPTHER

## 2021-01-11 ASSESSMENT — ENCOUNTER SYMPTOMS
SEIZURES: 0
MEMORY LOSS: 0
LOSS OF CONSCIOUSNESS: 0

## 2021-01-11 ASSESSMENT — FIBROSIS 4 INDEX: FIB4 SCORE: 2.57

## 2021-01-11 ASSESSMENT — PATIENT HEALTH QUESTIONNAIRE - PHQ9: CLINICAL INTERPRETATION OF PHQ2 SCORE: 0

## 2021-01-11 NOTE — PROGRESS NOTES
"Subjective:      Rosalia Haines is a 61 y.o. female who presents for annual follow-up, with a history of symptomatic focal seizures related to a right temporal meningioma resection in 1999.    HPI    Rosalia states that she actually has had a spontaneous remission of her seizures over the last 6 months or so.  Typically they would occur in association with physical activity, and given that she is an active workout person, neither of us had been happy with this fact.  Still on Keppra 500 mg every morning and 250 mg in the evening, we had talked about increasing the dose slightly, she has always had a rough time tolerating higher doses in any case.  She also does not like to tweak her regimen that she thinks may be helping her.    Though I had wanted to repeat an EEG last year, given the high deductible, she had always passed.  Given that events seem to have spontaneously improved, she never followed through with this recommendation.    The meningioma itself has never been reimaged, her last study in 2016, and though I recommended getting another study done, with Covid-19 infections this year, she never followed through with this repeat imaging study either.    Between seizures, she has always been asymptomatic.  There has never been a sustained postictal state when she is having a small event.    Medical, surgical and family histories are reviewed, there are no new drug allergies.  She is on Keppra as above, and on vitamin D 1000 mg daily supplement.    Review of Systems   Neurological: Negative for seizures and loss of consciousness.   Psychiatric/Behavioral: Negative for memory loss.   All other systems reviewed and are negative.       Objective:     /70 (BP Location: Right arm, Patient Position: Sitting, BP Cuff Size: Adult)   Pulse (!) 50   Temp 36.7 °C (98.1 °F) (Temporal)   Ht 1.6 m (5' 3\")   Wt 47.2 kg (104 lb)   SpO2 100%   BMI 18.42 kg/m²      Physical Exam    She appears no acute " distress.  Vital signs are stable, she still continues with the sinus bradycardia, this time her rate at 50.  She has never been symptomatic in regards to orthostatic dizziness or actual syncope.  There is no malar rash.  Cardiac evaluation is unremarkable.    In quick and cursory fashion, mental status, cranial nerve, musculoskeletal, coordination, and sensory evaluations remain fully intact.     Assessment/Plan:     1. Symptomatic localization-related epilepsy (HCC)  We will maintain her present regimen unchanged.  I suspect we will never get her in for an EEG or an MRI scan unless her seizures were to significantly change and worsen or she were to show persistent interictal focal deficits, respectively.  I am comfortable with this.  She is compliant.  We will follow-up in 1 year.    - levETIRAcetam (KEPPRA) 500 MG Tab; 1 tab qAM and 0.5 tab qPM  Dispense: 50 Tab; Refill: 11    Time: 20 minutes spent face-to-face for exam, review, discussion, and education, of this over 50% of the time spent counseling and coordinating care.

## 2021-02-05 ENCOUNTER — HOSPITAL ENCOUNTER (OUTPATIENT)
Dept: LAB | Facility: MEDICAL CENTER | Age: 61
End: 2021-02-05
Attending: FAMILY MEDICINE
Payer: COMMERCIAL

## 2021-02-05 LAB
ALBUMIN SERPL BCP-MCNC: 4.1 G/DL (ref 3.2–4.9)
ALBUMIN/GLOB SERPL: 1.8 G/DL
ALP SERPL-CCNC: 78 U/L (ref 30–99)
ALT SERPL-CCNC: 14 U/L (ref 2–50)
ANION GAP SERPL CALC-SCNC: 9 MMOL/L (ref 7–16)
AST SERPL-CCNC: 25 U/L (ref 12–45)
BASOPHILS # BLD AUTO: 1.1 % (ref 0–1.8)
BASOPHILS # BLD: 0.03 K/UL (ref 0–0.12)
BILIRUB SERPL-MCNC: 0.3 MG/DL (ref 0.1–1.5)
BUN SERPL-MCNC: 26 MG/DL (ref 8–22)
CALCIUM SERPL-MCNC: 9 MG/DL (ref 8.5–10.5)
CHLORIDE SERPL-SCNC: 107 MMOL/L (ref 96–112)
CHOLEST SERPL-MCNC: 180 MG/DL (ref 100–199)
CO2 SERPL-SCNC: 27 MMOL/L (ref 20–33)
CREAT SERPL-MCNC: 0.71 MG/DL (ref 0.5–1.4)
EOSINOPHIL # BLD AUTO: 0.06 K/UL (ref 0–0.51)
EOSINOPHIL NFR BLD: 2.1 % (ref 0–6.9)
ERYTHROCYTE [DISTWIDTH] IN BLOOD BY AUTOMATED COUNT: 43.5 FL (ref 35.9–50)
FASTING STATUS PATIENT QL REPORTED: NORMAL
GLOBULIN SER CALC-MCNC: 2.3 G/DL (ref 1.9–3.5)
GLUCOSE SERPL-MCNC: 85 MG/DL (ref 65–99)
HCT VFR BLD AUTO: 41.6 % (ref 37–47)
HDLC SERPL-MCNC: 85 MG/DL
HGB BLD-MCNC: 13.5 G/DL (ref 12–16)
IMM GRANULOCYTES # BLD AUTO: 0.01 K/UL (ref 0–0.11)
IMM GRANULOCYTES NFR BLD AUTO: 0.4 % (ref 0–0.9)
LDLC SERPL CALC-MCNC: 86 MG/DL
LYMPHOCYTES # BLD AUTO: 1.26 K/UL (ref 1–4.8)
LYMPHOCYTES NFR BLD: 44.8 % (ref 22–41)
MCH RBC QN AUTO: 29.2 PG (ref 27–33)
MCHC RBC AUTO-ENTMCNC: 32.5 G/DL (ref 33.6–35)
MCV RBC AUTO: 89.8 FL (ref 81.4–97.8)
MONOCYTES # BLD AUTO: 0.3 K/UL (ref 0–0.85)
MONOCYTES NFR BLD AUTO: 10.7 % (ref 0–13.4)
NEUTROPHILS # BLD AUTO: 1.15 K/UL (ref 2–7.15)
NEUTROPHILS NFR BLD: 40.9 % (ref 44–72)
NRBC # BLD AUTO: 0 K/UL
NRBC BLD-RTO: 0 /100 WBC
PLATELET # BLD AUTO: 161 K/UL (ref 164–446)
PMV BLD AUTO: 11.2 FL (ref 9–12.9)
POTASSIUM SERPL-SCNC: 4.6 MMOL/L (ref 3.6–5.5)
PROT SERPL-MCNC: 6.4 G/DL (ref 6–8.2)
RBC # BLD AUTO: 4.63 M/UL (ref 4.2–5.4)
SODIUM SERPL-SCNC: 143 MMOL/L (ref 135–145)
TRIGL SERPL-MCNC: 44 MG/DL (ref 0–149)
WBC # BLD AUTO: 2.8 K/UL (ref 4.8–10.8)

## 2021-02-05 PROCEDURE — 85025 COMPLETE CBC W/AUTO DIFF WBC: CPT

## 2021-02-05 PROCEDURE — 80061 LIPID PANEL: CPT

## 2021-02-05 PROCEDURE — 80053 COMPREHEN METABOLIC PANEL: CPT

## 2021-02-05 PROCEDURE — 36415 COLL VENOUS BLD VENIPUNCTURE: CPT

## 2021-04-21 ENCOUNTER — IMMUNIZATION (OUTPATIENT)
Dept: FAMILY PLANNING/WOMEN'S HEALTH CLINIC | Facility: IMMUNIZATION CENTER | Age: 61
End: 2021-04-21
Payer: COMMERCIAL

## 2021-04-21 DIAGNOSIS — Z23 ENCOUNTER FOR VACCINATION: Primary | ICD-10-CM

## 2021-04-21 PROCEDURE — 91300 PFIZER SARS-COV-2 VACCINE: CPT

## 2021-04-21 PROCEDURE — 0001A PFIZER SARS-COV-2 VACCINE: CPT

## 2021-05-13 ENCOUNTER — IMMUNIZATION (OUTPATIENT)
Dept: FAMILY PLANNING/WOMEN'S HEALTH CLINIC | Facility: IMMUNIZATION CENTER | Age: 61
End: 2021-05-13
Payer: COMMERCIAL

## 2021-05-13 DIAGNOSIS — Z23 ENCOUNTER FOR VACCINATION: Primary | ICD-10-CM

## 2021-05-13 PROCEDURE — 91300 PFIZER SARS-COV-2 VACCINE: CPT | Performed by: INTERNAL MEDICINE

## 2021-05-13 PROCEDURE — 0002A PFIZER SARS-COV-2 VACCINE: CPT | Performed by: INTERNAL MEDICINE

## 2021-06-09 ENCOUNTER — HOSPITAL ENCOUNTER (OUTPATIENT)
Dept: RADIOLOGY | Facility: MEDICAL CENTER | Age: 61
End: 2021-06-09
Payer: COMMERCIAL

## 2021-10-05 ENCOUNTER — HOSPITAL ENCOUNTER (OUTPATIENT)
Dept: RADIOLOGY | Facility: MEDICAL CENTER | Age: 61
End: 2021-10-05
Attending: OBSTETRICS & GYNECOLOGY
Payer: COMMERCIAL

## 2021-10-05 DIAGNOSIS — Z12.31 ENCOUNTER FOR SCREENING MAMMOGRAM FOR BREAST CANCER: ICD-10-CM

## 2021-10-05 PROCEDURE — 77063 BREAST TOMOSYNTHESIS BI: CPT

## 2022-01-31 ENCOUNTER — OFFICE VISIT (OUTPATIENT)
Dept: NEUROLOGY | Facility: MEDICAL CENTER | Age: 62
End: 2022-01-31
Attending: PSYCHIATRY & NEUROLOGY
Payer: COMMERCIAL

## 2022-01-31 VITALS
OXYGEN SATURATION: 97 % | HEART RATE: 53 BPM | SYSTOLIC BLOOD PRESSURE: 122 MMHG | HEIGHT: 63 IN | DIASTOLIC BLOOD PRESSURE: 88 MMHG | WEIGHT: 104 LBS | TEMPERATURE: 97.2 F | BODY MASS INDEX: 18.43 KG/M2 | RESPIRATION RATE: 16 BRPM

## 2022-01-31 DIAGNOSIS — G40.109 SYMPTOMATIC LOCALIZATION-RELATED EPILEPSY (HCC): Primary | ICD-10-CM

## 2022-01-31 PROCEDURE — 99214 OFFICE O/P EST MOD 30 MIN: CPT | Performed by: PSYCHIATRY & NEUROLOGY

## 2022-01-31 PROCEDURE — 99211 OFF/OP EST MAY X REQ PHY/QHP: CPT | Performed by: PSYCHIATRY & NEUROLOGY

## 2022-01-31 RX ORDER — LEVETIRACETAM 500 MG/1
TABLET ORAL
Qty: 50 TABLET | Refills: 11 | Status: SHIPPED | OUTPATIENT
Start: 2022-01-31 | End: 2023-02-13 | Stop reason: SDUPTHER

## 2022-01-31 ASSESSMENT — PATIENT HEALTH QUESTIONNAIRE - PHQ9: CLINICAL INTERPRETATION OF PHQ2 SCORE: 0

## 2022-01-31 ASSESSMENT — ENCOUNTER SYMPTOMS
SEIZURES: 1
MEMORY LOSS: 0
LOSS OF CONSCIOUSNESS: 0
TREMORS: 0

## 2022-01-31 ASSESSMENT — FIBROSIS 4 INDEX: FIB4 SCORE: 2.49

## 2022-01-31 ASSESSMENT — PAIN SCALES - GENERAL: PAINLEVEL: NO PAIN

## 2022-01-31 NOTE — PROGRESS NOTES
"Subjective     Rosalia Haines is a 62 y.o. female who presents for annual follow-up, with a history of symptomatic focal onset seizures.     MIKE Lindsey states that over the last year she has done well, she has had only one breakthrough event this last November, again typically following heavy workout in the afternoon.  She is now working from home 4 days a week, this has relieved a significant amount of stress in her life.  With the events in question, she was well slept, had eaten regularly, was compliant with medication.  She had not been started on any new medication at the time.    She remains on Keppra 500 mg in the morning and 250 mg in the afternoons, denies side effects including drowsiness, tremor, headache, personality change, or insomnia.  She also takes a vitamin D 1000 unit supplement daily.    Medical, surgical and family histories are reviewed, there are no new drug allergies.  Her meningioma, I can only presume, is stable, last imaged in 2016.  She remains physically active, a workout junky, getting to the gym 3 days a week, running on her treadmill about 4 days a week at home.  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    Review of Systems   Constitutional: Negative for malaise/fatigue.   Neurological: Positive for seizures. Negative for tremors and loss of consciousness.   Psychiatric/Behavioral: Negative for memory loss.   All other systems reviewed and are negative.    Objective     /88 (BP Location: Right arm, Patient Position: Sitting, BP Cuff Size: Adult)   Pulse (!) 53   Temp 36.2 °C (97.2 °F) (Temporal)   Resp 16   Ht 1.6 m (5' 3\")   Wt 47.2 kg (104 lb)   SpO2 97%   BMI 18.42 kg/m²      Physical Exam    She appears in no acute distress.  Vital signs are stable.  There is no malar rash.  Her neck is supple.  Cardiac evaluation is unremarkable.  There is no evidence of rash or lower extremity " edema.     Neurological Exam    Fully oriented, there is no aphasia, apraxia, or inattention.    PERRLA/EOMI, visual fields are full to finger counting on confrontation bilaterally, facial movements are symmetric, there is no bulbar dysfunction.  Sensory exam is intact to temperature and light touch bilaterally.  There is no extinction on double simultaneous stimulation.  Shoulder shrug and head rotation are intact.    Musculoskeletal exam reveals normal tone bilaterally, there is no tremor, asterixis, or drift.  Strength is intact at 5/5 throughout.  Reflexes are present at all points, there are no asymmetries, both toes are downgoing.    She stands easily, armswing is symmetric, gait is normal and station and stride length.  Heel, toe, and tandem walking can be done without the need for assistance.  There is no appendicular dystaxia.  Repetitive movements with fine motor control are also normal with symmetric amplitude and frequencies bilaterally.    Sensory exam is intact to vibration and temperature.    Assessment & Plan     1. Symptomatic localization-related epilepsy (HCC)  Clinically stable, we will continue her present medication regimen.  It has always been a bit of an uphill olivares for me to get her to acquiesce to obtaining an EEG and a repeat MRI scan of the brain.  Given that she has been stable, even though I have always wanted to increase her evening dose of Keppra slightly, she has had problems with side effects including drowsiness, so we have both agreed to stay the course that we both also have a low threshold that may warrant changing her medications, reimaging, etc.  She will continue the vitamin D supplement.  She always can contact the office if there is any unusual breakthrough seizure activity such as clustering, seizure with secondary generalization, etc.  We will follow-up in 1 year as usual.    - levETIRAcetam (KEPPRA) 500 MG Tab; 1 tab qAM and 0.5 tab qPM  Dispense: 50 Tablet; Refill:  11    Time: 30 minutes in total spent on patient care including precharting, record review, discussions with healthcare staff and documentation.  This includes face-to-face time with the patient for exam, review, discussion, education, as well as counseling and coordinating care.

## 2022-02-09 ENCOUNTER — HOSPITAL ENCOUNTER (OUTPATIENT)
Dept: LAB | Facility: MEDICAL CENTER | Age: 62
End: 2022-02-09
Attending: FAMILY MEDICINE
Payer: COMMERCIAL

## 2022-02-09 LAB
25(OH)D3 SERPL-MCNC: 49 NG/ML (ref 30–100)
ALBUMIN SERPL BCP-MCNC: 4.3 G/DL (ref 3.2–4.9)
ALBUMIN/GLOB SERPL: 1.9 G/DL
ALP SERPL-CCNC: 91 U/L (ref 30–99)
ALT SERPL-CCNC: 13 U/L (ref 2–50)
ANION GAP SERPL CALC-SCNC: 9 MMOL/L (ref 7–16)
AST SERPL-CCNC: 15 U/L (ref 12–45)
BASOPHILS # BLD AUTO: 0.9 % (ref 0–1.8)
BASOPHILS # BLD: 0.03 K/UL (ref 0–0.12)
BILIRUB SERPL-MCNC: 0.3 MG/DL (ref 0.1–1.5)
BUN SERPL-MCNC: 30 MG/DL (ref 8–22)
CALCIUM SERPL-MCNC: 8.9 MG/DL (ref 8.5–10.5)
CHLORIDE SERPL-SCNC: 106 MMOL/L (ref 96–112)
CHOLEST SERPL-MCNC: 218 MG/DL (ref 100–199)
CO2 SERPL-SCNC: 26 MMOL/L (ref 20–33)
CREAT SERPL-MCNC: 0.65 MG/DL (ref 0.5–1.4)
EOSINOPHIL # BLD AUTO: 0.07 K/UL (ref 0–0.51)
EOSINOPHIL NFR BLD: 2.2 % (ref 0–6.9)
ERYTHROCYTE [DISTWIDTH] IN BLOOD BY AUTOMATED COUNT: 42.5 FL (ref 35.9–50)
FASTING STATUS PATIENT QL REPORTED: NORMAL
GLOBULIN SER CALC-MCNC: 2.3 G/DL (ref 1.9–3.5)
GLUCOSE SERPL-MCNC: 77 MG/DL (ref 65–99)
HCT VFR BLD AUTO: 42.5 % (ref 37–47)
HDLC SERPL-MCNC: 88 MG/DL
HGB BLD-MCNC: 13.8 G/DL (ref 12–16)
IMM GRANULOCYTES # BLD AUTO: 0 K/UL (ref 0–0.11)
IMM GRANULOCYTES NFR BLD AUTO: 0 % (ref 0–0.9)
LDLC SERPL CALC-MCNC: 122 MG/DL
LYMPHOCYTES # BLD AUTO: 1.37 K/UL (ref 1–4.8)
LYMPHOCYTES NFR BLD: 43.1 % (ref 22–41)
MCH RBC QN AUTO: 28.5 PG (ref 27–33)
MCHC RBC AUTO-ENTMCNC: 32.5 G/DL (ref 33.6–35)
MCV RBC AUTO: 87.8 FL (ref 81.4–97.8)
MONOCYTES # BLD AUTO: 0.27 K/UL (ref 0–0.85)
MONOCYTES NFR BLD AUTO: 8.5 % (ref 0–13.4)
NEUTROPHILS # BLD AUTO: 1.44 K/UL (ref 2–7.15)
NEUTROPHILS NFR BLD: 45.3 % (ref 44–72)
NRBC # BLD AUTO: 0 K/UL
NRBC BLD-RTO: 0 /100 WBC
PLATELET # BLD AUTO: 168 K/UL (ref 164–446)
PMV BLD AUTO: 11.3 FL (ref 9–12.9)
POTASSIUM SERPL-SCNC: 4 MMOL/L (ref 3.6–5.5)
PROT SERPL-MCNC: 6.6 G/DL (ref 6–8.2)
RBC # BLD AUTO: 4.84 M/UL (ref 4.2–5.4)
SODIUM SERPL-SCNC: 141 MMOL/L (ref 135–145)
TRIGL SERPL-MCNC: 41 MG/DL (ref 0–149)
WBC # BLD AUTO: 3.2 K/UL (ref 4.8–10.8)

## 2022-02-09 PROCEDURE — 85025 COMPLETE CBC W/AUTO DIFF WBC: CPT

## 2022-02-09 PROCEDURE — 80053 COMPREHEN METABOLIC PANEL: CPT

## 2022-02-09 PROCEDURE — 36415 COLL VENOUS BLD VENIPUNCTURE: CPT

## 2022-02-09 PROCEDURE — 80061 LIPID PANEL: CPT

## 2022-02-09 PROCEDURE — 82306 VITAMIN D 25 HYDROXY: CPT

## 2023-02-13 ENCOUNTER — OFFICE VISIT (OUTPATIENT)
Dept: NEUROLOGY | Facility: MEDICAL CENTER | Age: 63
End: 2023-02-13
Attending: PSYCHIATRY & NEUROLOGY
Payer: COMMERCIAL

## 2023-02-13 VITALS
HEART RATE: 44 BPM | WEIGHT: 104 LBS | OXYGEN SATURATION: 99 % | DIASTOLIC BLOOD PRESSURE: 82 MMHG | BODY MASS INDEX: 18.43 KG/M2 | HEIGHT: 63 IN | TEMPERATURE: 98.7 F | RESPIRATION RATE: 18 BRPM | SYSTOLIC BLOOD PRESSURE: 118 MMHG

## 2023-02-13 DIAGNOSIS — G40.109 SYMPTOMATIC LOCALIZATION-RELATED EPILEPSY (HCC): Primary | ICD-10-CM

## 2023-02-13 PROCEDURE — 99211 OFF/OP EST MAY X REQ PHY/QHP: CPT | Performed by: PSYCHIATRY & NEUROLOGY

## 2023-02-13 PROCEDURE — 99214 OFFICE O/P EST MOD 30 MIN: CPT | Performed by: PSYCHIATRY & NEUROLOGY

## 2023-02-13 RX ORDER — LEVETIRACETAM 500 MG/1
TABLET ORAL
Qty: 50 TABLET | Refills: 11 | Status: SHIPPED | OUTPATIENT
Start: 2023-02-13 | End: 2024-02-12 | Stop reason: SDUPTHER

## 2023-02-13 ASSESSMENT — FIBROSIS 4 INDEX: FIB4 SCORE: 1.56

## 2023-02-13 ASSESSMENT — ENCOUNTER SYMPTOMS
LOSS OF CONSCIOUSNESS: 0
SEIZURES: 1
MEMORY LOSS: 0

## 2023-02-13 ASSESSMENT — PATIENT HEALTH QUESTIONNAIRE - PHQ9: CLINICAL INTERPRETATION OF PHQ2 SCORE: 0

## 2023-02-13 NOTE — PROGRESS NOTES
"Subjective     Rosalia Haines is a 63 y.o. 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     MIKE Lindsey was in her usual state of health at that time.  She was in a store with a friend, and there was a sudden altered sensorium.  She remembers only awakening after ambulance staff had arrived.  She refused transfer to the hospital.  Evidently she was noted to be altered, she has no recollection of the arrival of EMS, and some of their questioning.  There was some malaise after the event but she recovered completely as always.  EMS staff, by law, notified DMV and she had to surrender her license.  She now presents to have her license reinstated.    There was nothing that occurred prior to the event itself.  She was under significant stress from work, though this is a baseline.  She was not working out, this is typically the trigger for a small seizure, the last complex partial event she had occurred in November, 2021, while she was at the gym.  She was compliant with her Keppra 500 mg in the morning and 250 mg in the evening.    Medical, surgical and family histories are reviewed, there are no new drug allergies.  She is on Keppra as above, as well as vitamin D.    Review of Systems   Neurological:  Positive for seizures. Negative for loss of consciousness.   Psychiatric/Behavioral:  Negative for memory loss.    All other systems reviewed and are negative.    Objective     /82 (BP Location: Right arm, Patient Position: Sitting, BP Cuff Size: Adult)   Pulse (!) 44   Temp 37.1 °C (98.7 °F) (Temporal)   Resp 18   Ht 1.6 m (5' 3\")   Wt 47.2 kg (104 lb)   SpO2 99%   BMI 18.42 kg/m²      Physical Exam    She appears in no acute distress, though she is quite anxious because of the issues surrounding her licensure.  She is cooperative.  Vital signs " are stable.  There is no malar rash.  Her neck is supple.  Cardiac evaluation is unremarkable.     Neurological Exam    Fully oriented, there is no aphasia, apraxia, or inattention.    PERRLA/EOMI, visual fields are full, facial movements are symmetric, sensory exam is intact to light touch. There is no bulbar dysfunction, the tongue and uvula are midline.  Shoulder shrug and head rotation are normal.    Musculoskeletal exam reveals normal tone without tremor or drift.  Strength is intact throughout.    She stands easily, gait is normal and station and stride length, there is no appendicular dystaxia.  Fine motor control is intact in all 4 extremities.    Sensory exam is intact to temperature and light touch.    Assessment & Plan       1. Symptomatic localization-related epilepsy (HCC)  Unfortunately, this last event was unprovoked, she was not working out, though she was stressed, but this is her lifestyle based on her professional pursuits and responsibilities.  She was not sleep deprived.  In any case, she is given clearance to drive as of March 8, 2023, she was told that this is the law in the state of [place] requiring a 3-month interval after any event of altered sensorium, loss of consciousness and voluntary motor activity.  Though frustrated, she did understand the reasoning behind this.    We will continue her Keppra unchanged, her condition is quite stable otherwise.  She is compliant.  She and I can simply follow-up in 1 year, though she was told she must call the office for any event recurrences, to avoid something like this happening again.    - levETIRAcetam (KEPPRA) 500 MG Tab; 1 tab qAM and 0.5 tab qPM  Dispense: 50 Tablet; Refill: 11    Time: 30 minutes in total spent on patient care including precharting, record review, discussion with healthcare staff and documentation.  This includes face-to-face time for exam, review, discussion, as well as counseling and coordinating care.

## 2024-02-12 ENCOUNTER — TELEPHONE (OUTPATIENT)
Dept: NEUROLOGY | Facility: MEDICAL CENTER | Age: 64
End: 2024-02-12
Payer: COMMERCIAL

## 2024-02-12 ENCOUNTER — OFFICE VISIT (OUTPATIENT)
Dept: NEUROLOGY | Facility: MEDICAL CENTER | Age: 64
End: 2024-02-12
Attending: PSYCHIATRY & NEUROLOGY
Payer: COMMERCIAL

## 2024-02-12 VITALS
HEIGHT: 63 IN | WEIGHT: 104 LBS | OXYGEN SATURATION: 100 % | TEMPERATURE: 97.4 F | SYSTOLIC BLOOD PRESSURE: 128 MMHG | HEART RATE: 50 BPM | BODY MASS INDEX: 18.43 KG/M2 | DIASTOLIC BLOOD PRESSURE: 80 MMHG

## 2024-02-12 DIAGNOSIS — G40.109 SYMPTOMATIC LOCALIZATION-RELATED EPILEPSY (HCC): Primary | ICD-10-CM

## 2024-02-12 PROCEDURE — 99214 OFFICE O/P EST MOD 30 MIN: CPT | Performed by: PSYCHIATRY & NEUROLOGY

## 2024-02-12 PROCEDURE — 99211 OFF/OP EST MAY X REQ PHY/QHP: CPT

## 2024-02-12 PROCEDURE — 3074F SYST BP LT 130 MM HG: CPT | Performed by: PSYCHIATRY & NEUROLOGY

## 2024-02-12 PROCEDURE — 99211 OFF/OP EST MAY X REQ PHY/QHP: CPT | Performed by: PSYCHIATRY & NEUROLOGY

## 2024-02-12 PROCEDURE — 3079F DIAST BP 80-89 MM HG: CPT | Performed by: PSYCHIATRY & NEUROLOGY

## 2024-02-12 RX ORDER — LEVETIRACETAM 500 MG/1
TABLET ORAL
Qty: 50 TABLET | Refills: 11 | Status: SHIPPED | OUTPATIENT
Start: 2024-02-12

## 2024-02-12 ASSESSMENT — PATIENT HEALTH QUESTIONNAIRE - PHQ9: CLINICAL INTERPRETATION OF PHQ2 SCORE: 0

## 2024-02-12 ASSESSMENT — ENCOUNTER SYMPTOMS
LOSS OF CONSCIOUSNESS: 0
FALLS: 0
SEIZURES: 0
MEMORY LOSS: 0

## 2024-02-12 ASSESSMENT — FIBROSIS 4 INDEX: FIB4 SCORE: 1.78

## 2024-02-12 NOTE — TELEPHONE ENCOUNTER
Received [employer] request via [employer]  for levETIRAcetam (KEPPRA) 500 MG Tab. (Quantity:50, Day Supply:30)     Insurance: [employer]  Member ID:  [id number]   BIN: [id number]  PCN: [facility]  Group: [id number]     Ran Test claim via [place] [facility] Pays for a 15.00 copay. Will outreach to patient to offer specialty pharmacy services and or release to preferred pharmacy

## 2024-02-12 NOTE — PROGRESS NOTES
Subjective     Rosalia Haines is a 64 y.o. female who presents for annual follow-up, with a history of symptomatic, focal onset seizures with altered sensorium.     HPI    Since last seen, Rosalia states that she has had a couple of small seizures where she is slightly altered, lightheaded, but she feels that she is keenly aware of what is going on around her.  These typically happen in the morning hours, again associated with a heavy workout for she goes to work.  If this happens, she take some time off immediately afterwards.  She is compliant with Keppra 5 mg in the morning and 250 mg in the afternoon.  She tolerates this dose without issue.    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  She is still working out regularly, jogs at least 5 miles every day, weather permitting.  She still does not like to drive, most of the time it is her , but now that her job is in [place], there is less stress.    I have always wanted to obtain repeat EEG as well as MRI imaging studies, her seizures symptomatic of resection of a right temporal meningioma, she has always been reluctant to do so despite my urging's and rationale.  Her last MRI scan from April 21, 2016 revealed no evidence of tumor recurrence in the postoperative cavity in the middle cranial fossa on the right.  Vitamin D from February, 2022 was 49.    Medical, surgical and family histories are reviewed, there are no new drug allergies.  She is on Keppra 5 mg in the morning and 250 mg in the afternoon, vitamin D, glucosamine/chondroitin sulfate and a multivitamin.    Review of Systems   Musculoskeletal:  Negative for falls.   Neurological:  Negative for seizures and loss of consciousness.   Psychiatric/Behavioral:  Negative for memory loss.    All other systems reviewed and  "are negative.    Objective     /80   Pulse (!) 50   Temp 36.3 °C (97.4 °F)   Ht 1.6 m (5' 3\")   Wt 47.2 kg (104 lb)   SpO2 100%   BMI 18.42 kg/m²      Physical Exam    She appears in no acute distress.  Vital signs are stable, pulse is a little low at 50, her rhythm is regular.  There is no malar rash, jaw or temporal tenderness, jaw claudication, or allodynia.  Her neck is supple, range of motion is full.  Cardiac evaluation is unremarkable.     Neurological Exam    Fully oriented, there is no aphasia, apraxia, or inattention.    PERRLA/EOMI, visual fields are full, facial movements are symmetric, sensory exam is intact to temperature and light touch bilaterally.  The tongue and uvula are midline, there is no bulbar dysfunction.  Jaw movements are intact.  Shoulder shrug and head rotation are normal.    Musculoskeletal exam reveals normal tone without tremor or drift.  There is no asterixis.  Strength is 5/5 in all muscle groups in all 4 extremities.  Reflexes are present at all points though they can be diminished bilaterally, there is some difficulty with relaxation.  None are dropped.  Both toes are downgoing.    She stands easily, gait is normal and station and stride length, armswing is symmetric.  There is no appendicular dystaxia.  Fine motor control is intact in all 4 extremities.    Sensory exam is intact to vibration and temperature, Romberg is absent.    Assessment & Plan     1. Symptomatic localization-related epilepsy (HCC)  Clinically stable, we will continue the Keppra regimen unchanged.  Her lifestyle changes certainly have afforded her a greater potential for seizure stabilization.  Even though she has the occasional focal seizures, it does not seem that there was an altered sensorium when they occur, she certainly is quite cautious about not driving afterwards.  She is compliant.  Her examination is unchanged, the history and consistent with seizure progression, thus there is no reason " to repeat an EEG study or obtain an MRI imaging study.  We will see each other in 1 year.    - levETIRAcetam (KEPPRA) 500 MG Tab; 1 tab qAM and 0.5 tab qPM  Dispense: 50 Tablet; Refill: 11    Time: 30 minutes in total spent in patient care including pre-charting, record review, discussion with healthcare staff and documentation.  This includes face-to-face time for exam, review, discussion, as well as counseling and coordinating care.

## 2024-02-13 ENCOUNTER — TELEPHONE (OUTPATIENT)
Dept: PHARMACY | Facility: MEDICAL CENTER | Age: 64
End: 2024-02-13
Payer: COMMERCIAL

## 2024-02-13 NOTE — TELEPHONE ENCOUNTER
Back to back call Attempts to contact patient at [phone] to discuss [facility] and services/benefits offered. No answer, left voicemail.    Rebekah Jones  Rx Coordinator   [phone]

## 2024-02-14 NOTE — TELEPHONE ENCOUNTER
Attempted to contact patient at [phone] to discuss [facility] and services/benefits offered. No answer, left voicemail.    Rebekah Jones  Rx Coordinator   [phone]

## 2025-02-14 ENCOUNTER — OFFICE VISIT (OUTPATIENT)
Dept: NEUROLOGY | Facility: MEDICAL CENTER | Age: 65
End: 2025-02-14
Attending: PSYCHIATRY & NEUROLOGY
Payer: COMMERCIAL

## 2025-02-14 VITALS
RESPIRATION RATE: 16 BRPM | HEART RATE: 44 BPM | BODY MASS INDEX: 19.14 KG/M2 | SYSTOLIC BLOOD PRESSURE: 120 MMHG | WEIGHT: 104 LBS | OXYGEN SATURATION: 100 % | HEIGHT: 62 IN | DIASTOLIC BLOOD PRESSURE: 80 MMHG

## 2025-02-14 DIAGNOSIS — G40.109 SYMPTOMATIC LOCALIZATION-RELATED EPILEPSY (HCC): ICD-10-CM

## 2025-02-14 PROCEDURE — 3079F DIAST BP 80-89 MM HG: CPT | Performed by: PSYCHIATRY & NEUROLOGY

## 2025-02-14 PROCEDURE — 99214 OFFICE O/P EST MOD 30 MIN: CPT | Performed by: PSYCHIATRY & NEUROLOGY

## 2025-02-14 PROCEDURE — 3074F SYST BP LT 130 MM HG: CPT | Performed by: PSYCHIATRY & NEUROLOGY

## 2025-02-14 PROCEDURE — 99211 OFF/OP EST MAY X REQ PHY/QHP: CPT | Performed by: PSYCHIATRY & NEUROLOGY

## 2025-02-14 RX ORDER — LEVETIRACETAM 500 MG/1
TABLET ORAL
Qty: 50 TABLET | Refills: 11 | Status: SHIPPED | OUTPATIENT
Start: 2025-02-14

## 2025-02-14 ASSESSMENT — ENCOUNTER SYMPTOMS
MEMORY LOSS: 0
SEIZURES: 1
LOSS OF CONSCIOUSNESS: 0

## 2025-02-14 ASSESSMENT — FIBROSIS 4 INDEX: FIB4 SCORE: 1.82

## 2025-02-14 ASSESSMENT — PATIENT HEALTH QUESTIONNAIRE - PHQ9: CLINICAL INTERPRETATION OF PHQ2 SCORE: 0

## 2025-02-14 NOTE — PROGRESS NOTES
"Subjective     Rosalia Haines is a 65 y.o. female who presents for her annual follow-up exam with a history of focal onset seizures.     MIKE Lindsey has done well over the last year.  She has had only occasional simple partial seizures occur, typically these would follow a heavy workout in the morning hours, but nothing with secondary generalization or even an altered sensorium.  These are rare.  There is still to a degree and predictable since she works out on a regular basis.    She remains on Keppra 500 mg in the morning and 250 mg in the afternoons.  She is compliant.  She has never had issues with tolerability.  She also remains on a vitamin D supplement.    Medical, surgical and family histories are reviewed, there are no new drug allergies.  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  The job is only 5 minutes from where she lives.  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    Other than the Keppra and vitamin D, she is on no other medications.    Review of Systems   Neurological:  Positive for seizures. Negative for loss of consciousness.   Psychiatric/Behavioral:  Negative for memory loss.    All other systems reviewed and are negative.    Objective     /80 (BP Location: Left arm, Patient Position: Sitting, BP Cuff Size: Adult)   Pulse (!) 44   Resp 16   Ht 1.575 m (5' 2\")   Wt 47.2 kg (104 lb)   SpO2 100%   BMI 19.02 kg/m²      Physical Exam    She appears in no acute distress.  As always, a very high energy and intense woman, very pleasant in spirit and demeanor, very cooperative.  Vital signs are stable.  There is no malar rash.  The neck is supple.  Cardiac evaluation reveals a regular rhythm.     Neurological Exam    Fully oriented, there is no aphasia, apraxia, or inattention.    PERRLA/EOMI, visual fields " are full, facial movements are symmetric, sensory exam is intact to temperature.  There is no bulbar dysfunction.  Jaw movements are intact.  Shoulder shrug and head rotation are symmetric.    Musculoskeletal exam reveals normal tone without tremor, asterixis, or drift.  Strength is intact throughout.  Reflexes are symmetric.    She stands easily, gait is normal and station and stride length.  There is no appendicular dystaxia.  Repetitive movements and fine motor control remain normal throughout, amplitude and frequencies of the former are symmetric.    Sensory exam is intact to vibration and temperature.  Assessment & Plan  Symptomatic localization-related epilepsy (HCC)  Stable, we continue Keppra unchanged.  The simple partial seizures she has on an occasion are limited, neither of us overly concerned.  We have tried to micromanage these, the outcomes were never good, having side effects with higher doses of Keppra when used on a sustained basis.  She knows the circumstances that increase seizure risk, she avoids them, has never injured herself or others.  She remains safe to drive.  The reduction in stress levels in her life is certainly not hurting matters.  She and I will follow-up in 1 year.    Orders:    levETIRAcetam (KEPPRA) 500 MG Tab; 1 tab qAM and 0.5 tab qPM    Time: 30 minutes in total spent on patient care including pre-charting, record review, discussion with healthcare staff and documentation.  This includes face-to-face time for exam, review, discussion, as well as counseling and coordinating care.    HCC Gap Form    Diagnosis to address: G40.109 - Symptomatic localization-related epilepsy (HCC)  Assessment and plan: Chronic, stable. Continue with current defined treatment plan: Follow-up at least annually.  Last edited 02/14/25 11:21 PST by Abimael Capone M.D.

## 2025-02-14 NOTE — ASSESSMENT & PLAN NOTE
Stable, we continue Keppra unchanged.  The simple partial seizures she has on an occasion are limited, neither of us overly concerned.  We have tried to micromanage these, the outcomes were never good, having side effects with higher doses of Keppra when used on a sustained basis.  She knows the circumstances that increase seizure risk, she avoids them, has never injured herself or others.  She remains safe to drive.  The reduction in stress levels in her life is certainly not hurting matters.  She and I will follow-up in 1 year.    Orders:    levETIRAcetam (KEPPRA) 500 MG Tab; 1 tab qAM and 0.5 tab qPM